# Patient Record
Sex: MALE | Race: WHITE | Employment: FULL TIME | URBAN - NONMETROPOLITAN AREA
[De-identification: names, ages, dates, MRNs, and addresses within clinical notes are randomized per-mention and may not be internally consistent; named-entity substitution may affect disease eponyms.]

---

## 2019-04-23 ENCOUNTER — APPOINTMENT (OUTPATIENT)
Dept: GENERAL RADIOLOGY | Age: 54
End: 2019-04-23
Payer: COMMERCIAL

## 2019-04-23 ENCOUNTER — HOSPITAL ENCOUNTER (EMERGENCY)
Age: 54
Discharge: HOME | End: 2019-04-24
Attending: EMERGENCY MEDICINE
Payer: COMMERCIAL

## 2019-04-23 DIAGNOSIS — R00.2 PALPITATIONS: Primary | ICD-10-CM

## 2019-04-23 LAB
ABSOLUTE EOS #: 0.15 K/UL (ref 0–0.44)
ABSOLUTE IMMATURE GRANULOCYTE: <0.03 K/UL (ref 0–0.3)
ABSOLUTE LYMPH #: 3.4 K/UL (ref 1.1–3.7)
ABSOLUTE MONO #: 0.46 K/UL (ref 0.1–1.2)
ANION GAP SERPL CALCULATED.3IONS-SCNC: 11 MMOL/L (ref 9–17)
BASOPHILS # BLD: 1 % (ref 0–2)
BASOPHILS ABSOLUTE: 0.03 K/UL (ref 0–0.2)
BUN BLDV-MCNC: 13 MG/DL (ref 6–20)
BUN/CREAT BLD: 12 (ref 9–20)
CALCIUM SERPL-MCNC: 9.1 MG/DL (ref 8.6–10.4)
CHLORIDE BLD-SCNC: 100 MMOL/L (ref 98–107)
CO2: 29 MMOL/L (ref 20–31)
CREAT SERPL-MCNC: 1.08 MG/DL (ref 0.7–1.2)
DIFFERENTIAL TYPE: ABNORMAL
EOSINOPHILS RELATIVE PERCENT: 2 % (ref 1–4)
GFR AFRICAN AMERICAN: >60 ML/MIN
GFR NON-AFRICAN AMERICAN: >60 ML/MIN
GFR SERPL CREATININE-BSD FRML MDRD: ABNORMAL ML/MIN/{1.73_M2}
GFR SERPL CREATININE-BSD FRML MDRD: ABNORMAL ML/MIN/{1.73_M2}
GLUCOSE BLD-MCNC: 102 MG/DL (ref 74–100)
GLUCOSE BLD-MCNC: 115 MG/DL (ref 70–99)
HCT VFR BLD CALC: 45.6 % (ref 40.7–50.3)
HEMOGLOBIN: 15.3 G/DL (ref 13–17)
IMMATURE GRANULOCYTES: 0 %
LYMPHOCYTES # BLD: 54 % (ref 24–43)
MAGNESIUM: 2.3 MG/DL (ref 1.6–2.6)
MCH RBC QN AUTO: 29.7 PG (ref 25.2–33.5)
MCHC RBC AUTO-ENTMCNC: 33.6 G/DL (ref 28.4–34.8)
MCV RBC AUTO: 88.5 FL (ref 82.6–102.9)
MONOCYTES # BLD: 7 % (ref 3–12)
NRBC AUTOMATED: 0 PER 100 WBC
PDW BLD-RTO: 11.9 % (ref 11.8–14.4)
PLATELET # BLD: 184 K/UL (ref 138–453)
PLATELET ESTIMATE: ABNORMAL
PMV BLD AUTO: 11 FL (ref 8.1–13.5)
POTASSIUM SERPL-SCNC: 3.6 MMOL/L (ref 3.7–5.3)
RBC # BLD: 5.15 M/UL (ref 4.21–5.77)
RBC # BLD: ABNORMAL 10*6/UL
SEG NEUTROPHILS: 36 % (ref 36–65)
SEGMENTED NEUTROPHILS ABSOLUTE COUNT: 2.22 K/UL (ref 1.5–8.1)
SODIUM BLD-SCNC: 140 MMOL/L (ref 135–144)
TROPONIN INTERP: NORMAL
TROPONIN T: <0.03 NG/ML
TROPONIN, HIGH SENSITIVITY: NORMAL NG/L (ref 0–22)
TSH SERPL DL<=0.05 MIU/L-ACNC: 3.19 MIU/L (ref 0.3–5)
WBC # BLD: 6.3 K/UL (ref 3.5–11.3)
WBC # BLD: ABNORMAL 10*3/UL

## 2019-04-23 PROCEDURE — 82947 ASSAY GLUCOSE BLOOD QUANT: CPT

## 2019-04-23 PROCEDURE — 85025 COMPLETE CBC W/AUTO DIFF WBC: CPT

## 2019-04-23 PROCEDURE — 6370000000 HC RX 637 (ALT 250 FOR IP): Performed by: EMERGENCY MEDICINE

## 2019-04-23 PROCEDURE — 96360 HYDRATION IV INFUSION INIT: CPT

## 2019-04-23 PROCEDURE — 71046 X-RAY EXAM CHEST 2 VIEWS: CPT

## 2019-04-23 PROCEDURE — 84484 ASSAY OF TROPONIN QUANT: CPT

## 2019-04-23 PROCEDURE — 80048 BASIC METABOLIC PNL TOTAL CA: CPT

## 2019-04-23 PROCEDURE — 84443 ASSAY THYROID STIM HORMONE: CPT

## 2019-04-23 PROCEDURE — 71045 X-RAY EXAM CHEST 1 VIEW: CPT

## 2019-04-23 PROCEDURE — 93005 ELECTROCARDIOGRAM TRACING: CPT

## 2019-04-23 PROCEDURE — 2580000003 HC RX 258: Performed by: EMERGENCY MEDICINE

## 2019-04-23 PROCEDURE — 99285 EMERGENCY DEPT VISIT HI MDM: CPT

## 2019-04-23 PROCEDURE — 96361 HYDRATE IV INFUSION ADD-ON: CPT

## 2019-04-23 PROCEDURE — 83735 ASSAY OF MAGNESIUM: CPT

## 2019-04-23 RX ORDER — ALPRAZOLAM 0.25 MG/1
0.25 TABLET ORAL NIGHTLY PRN
COMMUNITY

## 2019-04-23 RX ORDER — 0.9 % SODIUM CHLORIDE 0.9 %
1000 INTRAVENOUS SOLUTION INTRAVENOUS ONCE
Status: COMPLETED | OUTPATIENT
Start: 2019-04-23 | End: 2019-04-23

## 2019-04-23 RX ORDER — POTASSIUM CHLORIDE 20 MEQ/1
40 TABLET, EXTENDED RELEASE ORAL ONCE
Status: COMPLETED | OUTPATIENT
Start: 2019-04-23 | End: 2019-04-23

## 2019-04-23 RX ADMIN — POTASSIUM CHLORIDE 40 MEQ: 20 TABLET, EXTENDED RELEASE ORAL at 21:51

## 2019-04-23 RX ADMIN — SODIUM CHLORIDE 1000 ML: 9 INJECTION, SOLUTION INTRAVENOUS at 21:50

## 2019-04-24 ENCOUNTER — OFFICE VISIT (OUTPATIENT)
Dept: CARDIOLOGY | Age: 54
End: 2019-04-24
Payer: COMMERCIAL

## 2019-04-24 ENCOUNTER — HOSPITAL ENCOUNTER (OUTPATIENT)
Age: 54
Discharge: HOME | End: 2019-04-24
Payer: COMMERCIAL

## 2019-04-24 VITALS
BODY MASS INDEX: 19.61 KG/M2 | WEIGHT: 122 LBS | HEIGHT: 66 IN | DIASTOLIC BLOOD PRESSURE: 99 MMHG | TEMPERATURE: 97.8 F | SYSTOLIC BLOOD PRESSURE: 143 MMHG | HEART RATE: 64 BPM | RESPIRATION RATE: 16 BRPM | OXYGEN SATURATION: 97 %

## 2019-04-24 VITALS
OXYGEN SATURATION: 99 % | HEART RATE: 55 BPM | BODY MASS INDEX: 19.18 KG/M2 | SYSTOLIC BLOOD PRESSURE: 149 MMHG | HEIGHT: 67 IN | WEIGHT: 122.2 LBS | DIASTOLIC BLOOD PRESSURE: 92 MMHG | RESPIRATION RATE: 18 BRPM

## 2019-04-24 DIAGNOSIS — R53.83 FATIGUE, UNSPECIFIED TYPE: ICD-10-CM

## 2019-04-24 DIAGNOSIS — R00.2 PALPITATIONS: ICD-10-CM

## 2019-04-24 DIAGNOSIS — R53.83 TIREDNESS: ICD-10-CM

## 2019-04-24 DIAGNOSIS — R00.1 BRADYCARDIA: ICD-10-CM

## 2019-04-24 DIAGNOSIS — R42 LIGHTHEADEDNESS: ICD-10-CM

## 2019-04-24 DIAGNOSIS — R00.1 BRADYCARDIA: Primary | ICD-10-CM

## 2019-04-24 LAB
EKG ATRIAL RATE: 56 BPM
EKG ATRIAL RATE: 77 BPM
EKG P AXIS: 72 DEGREES
EKG P AXIS: 74 DEGREES
EKG P-R INTERVAL: 154 MS
EKG P-R INTERVAL: 162 MS
EKG Q-T INTERVAL: 416 MS
EKG Q-T INTERVAL: 448 MS
EKG QRS DURATION: 92 MS
EKG QRS DURATION: 94 MS
EKG QTC CALCULATION (BAZETT): 432 MS
EKG QTC CALCULATION (BAZETT): 470 MS
EKG R AXIS: 39 DEGREES
EKG R AXIS: 52 DEGREES
EKG T AXIS: 59 DEGREES
EKG T AXIS: 63 DEGREES
EKG VENTRICULAR RATE: 56 BPM
EKG VENTRICULAR RATE: 77 BPM
TROPONIN INTERP: NORMAL
TROPONIN T: <0.03 NG/ML
TROPONIN, HIGH SENSITIVITY: NORMAL NG/L (ref 0–22)

## 2019-04-24 PROCEDURE — 83835 ASSAY OF METANEPHRINES: CPT

## 2019-04-24 PROCEDURE — 99214 OFFICE O/P EST MOD 30 MIN: CPT | Performed by: FAMILY MEDICINE

## 2019-04-24 PROCEDURE — 93005 ELECTROCARDIOGRAM TRACING: CPT

## 2019-04-24 PROCEDURE — 36415 COLL VENOUS BLD VENIPUNCTURE: CPT

## 2019-04-24 NOTE — PATIENT INSTRUCTIONS
SURVEY:    You may be receiving a survey from [employer]) regarding your visit today. Please complete the survey to enable us to provide the highest quality of care to you and your family. If you cannot score us a very good on any question, please call the office to discuss how we could have made your experience a very good one. Thank you.

## 2019-04-24 NOTE — ED PROVIDER NOTES
[facility] COMPLAINT   Chief Complaint   Patient presents with    Chest Pain     began earlier today as not feeling right, has progressedf through day to ceeling like chest fluttering      HPI   Yuridia Evans is a 48 y.o. male who presents after palpitations. [place] 11 days ago he had some chest tight ness and low BP, and today his BP has been high and he has been shaky ( tremulous). No chest tightness  Today though. He is ajogger but has been jogging in 3 weeks. He lost a lot of weight in January after a relationship ended. He has not had any issues with high blood pressure ever before. Is not any vomiting or sweating but he did feel been clammy earlier. . There is no pain    REVIEW OF SYSTEMS   Cardiac: No Chest Pain, + palpitations  Neurologic: no Headache  Respiratory: No SOB  GI: No abdominal pain or vomiting   General: Denies Fever  All other review of systems otherwise negative. PAST MEDICAL & SURGICAL HISTORY   History reviewed. No pertinent past medical history. History reviewed. No pertinent surgical history. CURRENT MEDICATIONS   Current Outpatient Rx   Medication Sig Dispense Refill    buPROPion HCl (WELLBUTRIN PO) Take by mouth      ALPRAZolam (XANAX) 0.25 MG tablet Take 0.25 mg by mouth nightly as needed for Sleep.         ALLERGIES   No Known Allergies   SOCIAL & FAMILY HISTORY   Social History     Socioeconomic History    Marital status: Single     Spouse name: None    Number of children: None    Years of education: None    Highest education level: None   Occupational History    None   Social Needs    Financial resource strain: None    Food insecurity:     Worry: None     Inability: None    Transportation needs:     Medical: None     Non-medical: None   Tobacco Use    Smoking status: Never Smoker    Smokeless tobacco: Never Used   Substance and Sexual Activity    Alcohol use: Not Currently    Drug use: Never    Sexual activity: None Lifestyle    Physical activity:     Days per week: None     Minutes per session: None    Stress: None   Relationships    Social connections:     Talks on phone: None     Gets together: None     Attends Taoism service: None     Active member of club or organization: None     Attends meetings of clubs or organizations: None     Relationship status: None    Intimate partner violence:     Fear of current or ex partner: None     Emotionally abused: None     Physically abused: None     Forced sexual activity: None   Other Topics Concern    None   Social History Narrative    None     History reviewed. No pertinent family history. PHYSICAL EXAM   VITAL SIGNS: BP (!) 143/99   Pulse 64   Temp 97.8 °F (36.6 °C) (Tympanic)   Resp 16   Ht 5' 6\" (1.676 m)   Wt 122 lb (55.3 kg)   SpO2 97%   BMI 19.69 kg/m²    Constitutional: Well developed, well nourished, no acute distress   HENT: Atraumatic, moist mucus membranes, pupils equally round and reactive to light, extraocular movements intact  Neck: supple, no JVD   Respiratory: Lungs Clear, no retractions   Cardiovascular: Reg rate, no murmurs  Vascular: Radial pulses 2+ equal bilaterally  GI: Soft, nontender, normal bowel sounds  Musculoskeletal: No edema, no deformities  Integument: Skin warm and dry, no petechiae   Neurologic: Alert & oriented, grossly intact, pt was having some muscle fasciculations which stopped midway through his visit    Psych: Pleasant affect, no hallucinations     EKG (interpreted by me) Interpretation  Rhythm: normal sinus   Rate: 77 BPM  No stemi, no ectopy  RADIOLOGY/PROCEDURES   XR CHEST STANDARD (2 VW)   Final Result   No acute cardiopulmonary process. XR CHEST PORTABLE   Final Result   Indeterminate 8 mm nodular opacity in the left lower lung which could be   related to a nipple shadow. Otherwise no acute cardiopulmonary findings. Consider short-term follow-up or repeat exam with nipple markers in place.              ED COURSE & MEDICAL DECISION MAKING   Pertinent Labs & Imaging studies reviewed and interpreted. (See chart for details)  See chart for details of medications given during the ED stay. Vitals:    04/24/19 0040   BP: (!) 143/99   Pulse:    Resp:    Temp:    SpO2:      Differential Diagnosis: Cardiac arrhythmia, drop attack from a subarachnoid hemorrhage, sepsis, infection, anemia, Myocardial Infarction, seizure, vasovagal syncope, other. Mdm: Patient is well-appearing and 2 negative troponins. He is not having chest pain. I recommended they follow up with Dr. Adriana Edmond for a Holter monitor, and his regular doctor for further workup of his fasciculations and elevated blood pressure. FINAL IMPRESSION   1.  Palpitations        Plan: outpatient follow up    Electronically signed by Arlen Jacobs MD on 4/24/19 at 12:59 AM            Arlen Jacobs MD  04/24/19 8987

## 2019-04-24 NOTE — PROGRESS NOTES
Claire Cortez am scribing for and in the presence of Mele Kc. Fidelina ABRAHAM, MS, F.A.C.C..    Patient: Esthela Winslow  : 1965  Date of Visit: 2019    REASON FOR VISIT / CONSULTATION: Establish Cardiologist (HX:Bradycardia, fatigue Pt is here today to est cardiac care he has history of severe bradycardia and extreme fatigue. He states while at work his left side chest felt heaviness once home /52 pulse 138/68 P51 that night BP ran P:46 108/66 P55 woke up Sat felt better. He did seen PCP and he heard irregular heart beats. Week ago his /68 P47. He is feeling chest flutters and shakes, he did have 1 dull lingerg pain. Yesterday /90 P63 body became shaky fatigue then called 911 and brought in. ) and Other (He was treated with fluids and potassium.)      History of Present Illness:        Dear Buster Anderson, DO    I had the pleasure of seeing  Esthela Winslow in my office today. Mr. Jj Hughes is a 48 y.o. male with a recent history of fatigue. Mr. Jj Hughes went to see PCP for fatigue and feeling chest flutters he heard irregular heart beats then referred him. He states he has increased fatigue and became shaky and blood pressure began to rise and symptoms worsened which then EMS was called and he was brought to [facility] on 2019 EKG shows sinus Bradycardia. Mr. Jj Hughes states that recently had uncontrolled shakiness everywhere which was associated with very high blood pressure. He also reports on 19 while at work he felt like he had chest heaviness and his symptoms have been worsening since. He reports he became clammy, dry mouth and Blood Pressure was over 200 and his neighbors called EMS where he was transferred to [facility] and was treated. He states he had some lightheadedness during the episodes. He reports that his blood pressure is usually around 775-207 systolic and denies ever having high blood pressure in the past.    He denies ever being a smoker or any family history of heart disease. He reports having a relatively good exercise tolerance and denied any current or recent chest pain, shortness of breath, abdominal pain, bleeding problems, problems with his medications or any other concerns at this time. PAST MEDICAL HISTORY:       No past medical history on file. CURRENT ALLERGIES: Biltricide [praziquantel] REVIEW OF SYSTEMS: 14 systems were reviewed. Pertinent positives and negatives as above, all else negative. No past surgical history on file. Social History:  Social History     Tobacco Use    Smoking status: Never Smoker    Smokeless tobacco: Never Used   Substance Use Topics    Alcohol use: Not Currently    Drug use: Never        CURRENT MEDICATIONS:        Outpatient Medications Marked as Taking for the 4/24/19 encounter (Office Visit) with Amanda Blancas MD   Medication Sig Dispense Refill    buPROPion HCl (WELLBUTRIN PO) Take 150 mg by mouth       ALPRAZolam (XANAX) 0.25 MG tablet Take 0.25 mg by mouth nightly as needed for Sleep. FAMILY HISTORY: family history is not on file. Physical Examination:     BP (!) 149/92 (Site: Left Upper Arm, Position: Sitting, Cuff Size: Medium Adult)   Pulse 55   Resp 18   Ht 5' 6.5\" (1.689 m)   Wt 122 lb 3.2 oz (55.4 kg)   SpO2 99%   BMI 19.43 kg/m²  Body mass index is 19.43 kg/m². Constitutional: He appeared oriented to person and place. He appears well-developed and well-nourished. In no acute distress. HEENT: Normocephalic and atraumatic. No JVD present. Carotid bruit is not present. No mass and no thyromegaly present. No lymphadenopathy noted. Cardiovascular: Bradycardic rate, regular rhythm, normal heart sounds. Exam reveals no gallop and no friction rubs. 1/6 systolic murmur, 2nd intercostal space on the LEFT just lateral to the sternum  Pulmonary/Chest: Effort normal and breath sounds normal. No respiratory distress. He has no wheezes, rhonchi or rales. Abdominal: Soft, non-tender.  He exhibits no organomegaly, mass or bruit. Extremities: None. No cyanosis or clubbing. 2+ radial and carotid pulses. Distal extremity pulses: 2+ bilaterally. Neurological: Alertness and orientation as per Constitutional exam. No evidence of gross cranial nerve deficit. Coordination appeared normal.   Skin: Skin is warm and dry. There is no rash or diaphoresis. Psychiatric: He has a normal mood and affect. His speech is normal and behavior is normal.      MOST RECENT LABS ON RECORD:   Lab Results   Component Value Date    WBC 6.3 04/23/2019    HGB 15.3 04/23/2019    HCT 45.6 04/23/2019     04/23/2019     04/23/2019    K 3.6 (L) 04/23/2019     04/23/2019    CREATININE 1.08 04/23/2019    BUN 13 04/23/2019    CO2 29 04/23/2019    TSH 3.19 04/23/2019       ASSESSMENT:     1. Bradycardia    2. Lightheadedness    3. Tiredness    4. Fatigue, unspecified type    5. Palpitations       PLAN:        Bradycardia: Probably asymptomatic  · Beta Blocker: Not indicated      · Calcium Channel Blocker: Not indicated       · Additional Testing: I Ordered an Echocardiogram to assess Mr. Cox's ejection fraction and to look for significant structural causes of Mr. Cox symptoms    · Lightheadedness/dizziness: associated with sudden high blood pressure and chest discomfort  · Additional Testing List: I ordered a treadmill stress test WITHOUT imaging to try and rule out this possibility. · Because of his abnormal ECG, I ordered a echocardiogram to better assess for the etiology and severity of this problem. · Laboratory testing: Plasma epinephrine level to assess for the possibility of a pheochromocytoma as the source of his symptoms. · Tiredness/Fatigue:  · After discussion we decided to stop Bupropion to determine if this was the cause of recent symptoms. · I have a call out to Dr Marco Antonio Segura and discuss the stopping of medications. Persistent Heart palpitations   · Beta Blocker: Not indicated.  Normal ejection fraction

## 2019-04-29 LAB
METANEPH/PLASMA INTERP: NORMAL
METANEPHRINE: 0.16 NMOL/L (ref 0–0.49)
NORMETANEPHRINE PLASMA: 0.45 NMOL/L (ref 0–0.89)

## 2019-04-30 ENCOUNTER — TELEPHONE (OUTPATIENT)
Dept: CARDIOLOGY | Age: 54
End: 2019-04-30

## 2019-04-30 ENCOUNTER — HOSPITAL ENCOUNTER (OUTPATIENT)
Dept: NON INVASIVE DIAGNOSTICS | Age: 54
Discharge: HOME | End: 2019-04-30
Payer: COMMERCIAL

## 2019-04-30 DIAGNOSIS — R00.1 BRADYCARDIA: ICD-10-CM

## 2019-04-30 DIAGNOSIS — R42 LIGHTHEADEDNESS: ICD-10-CM

## 2019-04-30 PROCEDURE — 78452 HT MUSCLE IMAGE SPECT MULT: CPT

## 2019-04-30 PROCEDURE — 93017 CV STRESS TEST TRACING ONLY: CPT

## 2019-04-30 PROCEDURE — A9500 TC99M SESTAMIBI: HCPCS | Performed by: FAMILY MEDICINE

## 2019-04-30 PROCEDURE — 3430000000 HC RX DIAGNOSTIC RADIOPHARMACEUTICAL: Performed by: FAMILY MEDICINE

## 2019-04-30 PROCEDURE — 93018 CV STRESS TEST I&R ONLY: CPT | Performed by: FAMILY MEDICINE

## 2019-04-30 PROCEDURE — 93016 CV STRESS TEST SUPVJ ONLY: CPT | Performed by: FAMILY MEDICINE

## 2019-04-30 RX ADMIN — Medication 30.2 MILLICURIE: at 09:04

## 2019-04-30 NOTE — TELEPHONE ENCOUNTER
----- Message from Sharon Luna MD sent at 4/29/2019 10:40 PM EDT -----  Let Mr. Morgan Pennington know their test result was ok. Thanks.

## 2019-05-01 ENCOUNTER — HOSPITAL ENCOUNTER (OUTPATIENT)
Dept: NON INVASIVE DIAGNOSTICS | Age: 54
Discharge: HOME | End: 2019-05-01
Payer: COMMERCIAL

## 2019-05-01 PROCEDURE — 3430000000 HC RX DIAGNOSTIC RADIOPHARMACEUTICAL: Performed by: FAMILY MEDICINE

## 2019-05-01 PROCEDURE — A9500 TC99M SESTAMIBI: HCPCS | Performed by: FAMILY MEDICINE

## 2019-05-01 RX ADMIN — Medication 30 MILLICURIE: at 13:01

## 2019-05-02 PROCEDURE — 78452 HT MUSCLE IMAGE SPECT MULT: CPT | Performed by: FAMILY MEDICINE

## 2019-05-02 NOTE — PROCEDURES
[address], [place] [address]                              CARDIAC STRESS TEST    PATIENT NAME: Rosibel Dozier                     :        1965  MED REC NO:   [id number]                              ROOM:  ACCOUNT NO:   [de-identified]                           ADMIT DATE: 2019  PROVIDER:     Genet Segal    CARDIOVASCULAR DIAGNOSTIC DEPARTMENT    DATE OF STUDY:  2019    ORDERING PROVIDER:  Genet Segal MD    PRIMARY CARE PROVIDER:  Angelita Helms DO    INTERPRETING PHYSICIAN:  Genet Segal MD    EXERCISE MYOCARDIAL PERFUSION STRESS TEST REPORT    Stress/rest single-isotope SPECT imaging with exercise stress and gated  SPECT imaging    INDICATION:  Assessment of a cardiac cause of:  Abnormal ECG, bradycardia    CLINICAL HISTORY:  The patient is a 72-year-old man with no known  coronary artery disease. Previous cardiac history includes:  Stress test    Other previous history includes:  Chest pain, palpitations, diaphoresis,  fatigue, dyspnea, lightheadedness    Symptoms just prior to testing included:  None    Relevant medications:  None    PROCEDURE:  The patient performed treadmill exercise using a Aaron  protocol, completing 12:48 minutes and completing an estimated workload  of 55.74 metabolic equivalents (METS). The test was terminated due to fatigue and shortness of breath. The heart rate was 68 beats per minute at baseline and increased to 153  beats per minute at peak exercise, which was 90% of the maximum  predicted heart rate. The rest blood pressure was 126/86 mmHg and  increased to 160/94 mmHg, which is a normal response. During the  procedure, the patient developed fatigue, shortness of breath and leg  fatigue but denied any chest discomfort. Myocardial perfusion imaging: Imaging was performed at rest 30-45  minutes following the injection of 30.0 mCi of sestamibi.   At peak  exercise, the patient was injected with 30.0 mCi of sestamibi and  exercise was continued for 1 minute. Gating post-stress tomographic  imaging was performed 30-45 minutes after stress. STRESS ECG RESULTS:  The resting electrocardiogram demonstrated sinus  bradycardia without definitive ST-segment abnormalities suggestive of  myocardial ischemia. At peak exercise and during recovery, the patient developed:    No significant ST segment changes suggestive of myocardial ischemia with  no premature atrial contractions (PACs) and no premature ventricular  contractions (PVCs). NUCLEAR IMAGING RESULTS:  The overall quality of the study is good. Mild attenuation artifact was seen. There is no evidence of abnormal  lung uptake. Additionally, the right ventricle appears normal.  The  left ventricular cavity is noted to be normal in size on the stress  images. There is no evidence of transient ischemic dilatation (TID) of  the left ventricle. Gated SPECT imaging reveals normal myocardial thickening and wall motion  with a calculated left ventricular ejection fraction of 64%. The rest images demonstrate homogeneous tracer distribution throughout  the myocardium. On stress imaging, a small perfusion abnormality of mild intensity was  noted in the inferolateral region(s) which is most likely due to  artifact. IMPRESSION:  1. Most likely normal myocardial perfusion imaging with soft tissue  artifact but without evidence of significant myocardial ischemia or  infarction. 2.  Global left ventricular systolic function was normal without  regional wall motion abnormalities. 3.  No significant electrocardiographic evidence of myocardial ischemia  during EKG monitoring without significant associated arrhythmias. The patient's Duke Treadmill score is 10, which correlates with a low  risk for significant coronary artery disease.     Overall, these results are most consistent with a low risk for  significant coronary artery disease.           Caroline Mccallo    D: 05/02/2019 13:43:08       T: 05/02/2019 13:44:06     LEONEL/AMI_EDIT  Job#: [id number]     Doc#: Unknown    CC:  Roshan Ashraf

## 2019-05-03 ENCOUNTER — TELEPHONE (OUTPATIENT)
Dept: CARDIOLOGY | Age: 54
End: 2019-05-03

## 2019-05-03 NOTE — TELEPHONE ENCOUNTER
----- Message from Fawn Batista MD sent at 5/2/2019 11:22 PM EDT -----  Let  Rahel Bryce know their test result was ok. Thanks.

## 2019-05-10 ENCOUNTER — HOSPITAL ENCOUNTER (OUTPATIENT)
Dept: NON INVASIVE DIAGNOSTICS | Age: 54
Discharge: HOME | End: 2019-05-10
Payer: COMMERCIAL

## 2019-05-10 DIAGNOSIS — R00.1 BRADYCARDIA: ICD-10-CM

## 2019-05-10 DIAGNOSIS — R53.83 FATIGUE, UNSPECIFIED TYPE: ICD-10-CM

## 2019-05-10 DIAGNOSIS — R42 LIGHTHEADEDNESS: ICD-10-CM

## 2019-05-10 DIAGNOSIS — R53.83 TIREDNESS: ICD-10-CM

## 2019-05-10 DIAGNOSIS — R00.2 PALPITATIONS: ICD-10-CM

## 2019-05-10 LAB
LV EF: 60 %
LVEF MODALITY: NORMAL

## 2019-05-10 PROCEDURE — 93226 XTRNL ECG REC<48 HR SCAN A/R: CPT

## 2019-05-10 PROCEDURE — 93306 TTE W/DOPPLER COMPLETE: CPT

## 2019-05-10 PROCEDURE — 93225 XTRNL ECG REC<48 HRS REC: CPT

## 2019-05-13 ENCOUNTER — TELEPHONE (OUTPATIENT)
Dept: CARDIOLOGY | Age: 54
End: 2019-05-13

## 2019-05-13 NOTE — TELEPHONE ENCOUNTER
----- Message from Ari Calvo MD sent at 5/10/2019  4:54 PM EDT -----  Let Mr. Martinez Staff know their test result was ok. Thanks.

## 2019-05-14 ENCOUNTER — OFFICE VISIT (OUTPATIENT)
Dept: CARDIOLOGY | Age: 54
End: 2019-05-14
Payer: COMMERCIAL

## 2019-05-14 VITALS
BODY MASS INDEX: 19.53 KG/M2 | HEART RATE: 56 BPM | RESPIRATION RATE: 18 BRPM | SYSTOLIC BLOOD PRESSURE: 128 MMHG | WEIGHT: 124.4 LBS | OXYGEN SATURATION: 98 % | HEIGHT: 67 IN | DIASTOLIC BLOOD PRESSURE: 84 MMHG

## 2019-05-14 DIAGNOSIS — R53.83 TIREDNESS: ICD-10-CM

## 2019-05-14 DIAGNOSIS — R42 LIGHTHEADEDNESS: ICD-10-CM

## 2019-05-14 DIAGNOSIS — T50.905A MEDICATION SIDE EFFECT, INITIAL ENCOUNTER: Primary | ICD-10-CM

## 2019-05-14 DIAGNOSIS — R00.1 BRADYCARDIA: ICD-10-CM

## 2019-05-14 DIAGNOSIS — R00.2 PALPITATIONS: ICD-10-CM

## 2019-05-14 PROCEDURE — 99213 OFFICE O/P EST LOW 20 MIN: CPT | Performed by: FAMILY MEDICINE

## 2019-05-14 NOTE — PROGRESS NOTES
Claire Cortez am scribing for and in the presence of Mele Kc. Fidelina ABRAHAM, MS, F.A.C.C..    Patient: Esthela Winslow  : 1965  Date of Visit: May 14, 2019    REASON FOR VISIT / CONSULTATION: Follow-up (HX: Bradycardia, Lightheaded,Palitations, Fatigue Pt is here for follow up  he states he is feeling better since last visit. He did have stress test done 19 Holter 5/10 and echo. Denies: CP, SOB, lightheaded/dizziness)      History of Present Illness:        Dear Buster Anderson, DO    I had the pleasure of seeing  Esthela Winslow in my office today. Mr. Jj Hughes is a 48 y.o. male with a recent history of fatigue. Mr. Jj Hughes went to see PCP for fatigue and feeling chest flutters he heard irregular heart beats then referred him. He states he has increased fatigue and became shaky and blood pressure began to rise and symptoms worsened which then EMS was called and he was brought to [facility] on 2019 EKG shows sinus Bradycardia. Recent testing included stress test that was largely normal Joshua score was 10, his Holter monitor showed Bradycardia 49% of the time and occasional PVC's echo also done 5/10/2019 shows EF>60% and relatively normal.     Mr. Jj Hughes is here for follow up and says that he is feeling much better since last visit. He did cut his Wellbutrin in half and started noticing with the decrease of medication he started feeling better much better and almost all of his previous symptoms have resolved. He reports having a good exercise tolerance and denied any current or recent chest pain, shortness of breath, abdominal pain, bleeding problems, problems with his medications or any other concerns at this time. PAST MEDICAL HISTORY:         Past Medical History:   Diagnosis Date    History of echocardiogram 05/10/2019    EF of >60%. normal LV wall thickness w/ a normal LV cavity size. No definite specific wall motion abnormalities were identified. No significant valvular abnormalities.  No clear evidence of diastolic dysfunction was identified.  History of stress test myoview 04/30/2019    most likely normal myocardial perfusion imaging with soft tissue artifact but without evidence of significant myocardial ischemia or infarction. global LV systolic function was normal w/o regional wall motion abnormalities. no significant electrocardiographic evidence of myocardial ischemia during EKG monitoring w/o significant associated arrhythmias. CURRENT ALLERGIES: Biltricide [praziquantel] REVIEW OF SYSTEMS: 14 systems were reviewed. Pertinent positives and negatives as above, all else negative. No past surgical history on file. Social History:  Social History     Tobacco Use    Smoking status: Never Smoker    Smokeless tobacco: Never Used   Substance Use Topics    Alcohol use: Not Currently    Drug use: Never        CURRENT MEDICATIONS:        Outpatient Medications Marked as Taking for the 5/14/19 encounter (Office Visit) with Natan Pozo MD   Medication Sig Dispense Refill    buPROPion HCl (WELLBUTRIN PO) Take 150 mg by mouth Taking every other day      ALPRAZolam (XANAX) 0.25 MG tablet Take 0.25 mg by mouth nightly as needed for Sleep. FAMILY HISTORY: Reviewed but non-contributory     Physical Examination:     /84 (Site: Right Upper Arm, Position: Sitting, Cuff Size: Medium Adult)   Pulse 56   Resp 18   Ht 5' 6.5\" (1.689 m)   Wt 124 lb 6.4 oz (56.4 kg)   SpO2 98%   BMI 19.78 kg/m²  Body mass index is 19.78 kg/m². Constitutional: He appeared oriented to person and place. He appears well-developed and well-nourished. In no acute distress. HEENT: Normocephalic and atraumatic. No JVD present. Carotid bruit is not present. No mass and no thyromegaly present. No lymphadenopathy noted. Cardiovascular: Bradycardic rate, regular rhythm, normal heart sounds. Exam reveals no gallop and no friction rubs.  1/6 systolic murmur, 2nd intercostal space on the LEFT just lateral to the sternum  Pulmonary/Chest: Effort normal and breath sounds normal. No respiratory distress. He has no wheezes, rhonchi or rales. Abdominal: Soft, non-tender. He exhibits no organomegaly, mass or bruit. Extremities: None. No cyanosis or clubbing. 2+ radial and carotid pulses. Distal extremity pulses: 2+ bilaterally. Neurological: Alertness and orientation as per Constitutional exam. No evidence of gross cranial nerve deficit. Coordination appeared normal.   Skin: Skin is warm and dry. There is no rash or diaphoresis. Psychiatric: He has a normal mood and affect. His speech is normal and behavior is normal.      MOST RECENT LABS ON RECORD:   Lab Results   Component Value Date    WBC 6.3 04/23/2019    HGB 15.3 04/23/2019    HCT 45.6 04/23/2019     04/23/2019     04/23/2019    K 3.6 (L) 04/23/2019     04/23/2019    CREATININE 1.08 04/23/2019    BUN 13 04/23/2019    CO2 29 04/23/2019    TSH 3.19 04/23/2019       ASSESSMENT:     1. Medication side effect, initial encounter    2. Bradycardia    3. Lightheadedness    4. Tiredness    5. Palpitations       PLAN:        Bradycardia: Probably asymptomatic  · Beta Blocker: Not indicated      · Calcium Channel Blocker: Not indicated       · Additional Testing: None   · We did review all of his testing today in office. · Lightheadedness/dizziness: associated with sudden high blood pressure and chest discomfort     · Tiredness/Fatigue: Resolved at this time. Rare Heart palpitations   · Beta Blocker: Not indicated. Normal ejection fraction      · Anticoagulation: Not indicated   · Additional Testing: None     · Medication side effect: Resolved since cutting dose of Wellbutrin in Half: Continue    Finally, I recommended that he continue his other medications and follow up with you as previously scheduled. FOLLOW UP:   I told Mr. Dylan Chavez to call my office if he had any problems, but otherwise I asked him to Return if symptoms worsen or fail to improve. However, I would be happy to see him sooner should the need arise. Sincerely,  Geovanni Hurtado. Fidelina ABRAHAM, MS, F.A.C.C. [facility] Cardiology Specialist    [address] [place] [address] [place], [place], [address] [place] [address]  Phone: [phone], Fax: [phone]     I believe that the risk of significant morbidity and mortality related to the patient's current medical conditions are: low-intermediate. The documentation recorded by the scribe, accurately and completely reflects the services I personally performed and the decisions made by me. Fawn Batista MD, MS, F.A.C.C.  May 14, 2019

## 2019-05-14 NOTE — PATIENT INSTRUCTIONS
SURVEY:    You may be receiving a survey from [employer] regarding your visit today. Please complete the survey to enable us to provide the highest quality of care to you and your family. If you cannot score us a very good on any question, please call the office to discuss how we could have made your experience a very good one. Thank you.

## 2021-07-18 ENCOUNTER — APPOINTMENT (OUTPATIENT)
Dept: GENERAL RADIOLOGY | Age: 56
DRG: 305 | End: 2021-07-18
Payer: COMMERCIAL

## 2021-07-18 ENCOUNTER — APPOINTMENT (OUTPATIENT)
Dept: CT IMAGING | Age: 56
DRG: 305 | End: 2021-07-18
Payer: COMMERCIAL

## 2021-07-18 ENCOUNTER — HOSPITAL ENCOUNTER (INPATIENT)
Age: 56
LOS: 1 days | Discharge: HOME | DRG: 305 | End: 2021-07-19
Attending: INTERNAL MEDICINE | Admitting: INTERNAL MEDICINE
Payer: COMMERCIAL

## 2021-07-18 DIAGNOSIS — I10 HYPERTENSION, UNSPECIFIED TYPE: Primary | ICD-10-CM

## 2021-07-18 LAB
ABSOLUTE EOS #: 0.2 K/UL (ref 0–0.44)
ABSOLUTE IMMATURE GRANULOCYTE: <0.03 K/UL (ref 0–0.3)
ABSOLUTE LYMPH #: 2.75 K/UL (ref 1.1–3.7)
ABSOLUTE MONO #: 0.59 K/UL (ref 0.1–1.2)
ALBUMIN SERPL-MCNC: 4.6 G/DL (ref 3.5–5.2)
ALBUMIN/GLOBULIN RATIO: 1.6 (ref 1–2.5)
ALP BLD-CCNC: 55 U/L (ref 40–129)
ALT SERPL-CCNC: 13 U/L (ref 5–41)
AMPHETAMINE SCREEN URINE: NEGATIVE
ANION GAP SERPL CALCULATED.3IONS-SCNC: 13 MMOL/L (ref 9–17)
AST SERPL-CCNC: 18 U/L
BARBITURATE SCREEN URINE: NEGATIVE
BASOPHILS # BLD: 0 % (ref 0–2)
BASOPHILS ABSOLUTE: 0.04 K/UL (ref 0–0.2)
BENZODIAZEPINE SCREEN, URINE: NEGATIVE
BILIRUB SERPL-MCNC: 0.4 MG/DL (ref 0.3–1.2)
BUN BLDV-MCNC: 18 MG/DL (ref 6–20)
BUN/CREAT BLD: 18 (ref 9–20)
BUPRENORPHINE URINE: NEGATIVE
CALCIUM SERPL-MCNC: 9.2 MG/DL (ref 8.6–10.4)
CANNABINOID SCREEN URINE: NEGATIVE
CHLORIDE BLD-SCNC: 101 MMOL/L (ref 98–107)
CO2: 22 MMOL/L (ref 20–31)
COCAINE METABOLITE, URINE: NEGATIVE
CREAT SERPL-MCNC: 0.98 MG/DL (ref 0.7–1.2)
D-DIMER QUANTITATIVE: <0.27 MG/L FEU (ref 0–0.59)
DIFFERENTIAL TYPE: ABNORMAL
EOSINOPHILS RELATIVE PERCENT: 2 % (ref 1–4)
GFR AFRICAN AMERICAN: >60 ML/MIN
GFR NON-AFRICAN AMERICAN: >60 ML/MIN
GFR SERPL CREATININE-BSD FRML MDRD: ABNORMAL ML/MIN/{1.73_M2}
GFR SERPL CREATININE-BSD FRML MDRD: ABNORMAL ML/MIN/{1.73_M2}
GLUCOSE BLD-MCNC: 106 MG/DL (ref 70–99)
HCT VFR BLD CALC: 47.5 % (ref 40.7–50.3)
HEMOGLOBIN: 16.2 G/DL (ref 13–17)
IMMATURE GRANULOCYTES: 0 %
INR BLD: 1
LYMPHOCYTES # BLD: 31 % (ref 24–43)
MAGNESIUM: 2 MG/DL (ref 1.6–2.6)
MCH RBC QN AUTO: 30 PG (ref 25.2–33.5)
MCHC RBC AUTO-ENTMCNC: 34.1 G/DL (ref 28.4–34.8)
MCV RBC AUTO: 88 FL (ref 82.6–102.9)
MDMA URINE: NORMAL
METHADONE SCREEN, URINE: NEGATIVE
METHAMPHETAMINE, URINE: NEGATIVE
MONOCYTES # BLD: 7 % (ref 3–12)
NRBC AUTOMATED: 0 PER 100 WBC
OPIATES, URINE: NEGATIVE
OXYCODONE SCREEN URINE: NEGATIVE
PARTIAL THROMBOPLASTIN TIME: 26.6 SEC (ref 23.9–33.8)
PDW BLD-RTO: 11.4 % (ref 11.8–14.4)
PHENCYCLIDINE, URINE: NEGATIVE
PLATELET # BLD: 199 K/UL (ref 138–453)
PLATELET ESTIMATE: ABNORMAL
PMV BLD AUTO: 10 FL (ref 8.1–13.5)
POTASSIUM SERPL-SCNC: 3.5 MMOL/L (ref 3.7–5.3)
PROPOXYPHENE, URINE: NEGATIVE
PROTHROMBIN TIME: 13.2 SEC (ref 11.5–14.2)
RBC # BLD: 5.4 M/UL (ref 4.21–5.77)
RBC # BLD: ABNORMAL 10*6/UL
SEG NEUTROPHILS: 60 % (ref 36–65)
SEGMENTED NEUTROPHILS ABSOLUTE COUNT: 5.35 K/UL (ref 1.5–8.1)
SODIUM BLD-SCNC: 136 MMOL/L (ref 135–144)
TEST INFORMATION: NORMAL
TOTAL PROTEIN: 7.4 G/DL (ref 6.4–8.3)
TRICYCLIC ANTIDEPRESSANTS, UR: NEGATIVE
TROPONIN INTERP: NORMAL
TROPONIN INTERP: NORMAL
TROPONIN T: NORMAL NG/ML
TROPONIN T: NORMAL NG/ML
TROPONIN, HIGH SENSITIVITY: <6 NG/L (ref 0–22)
TROPONIN, HIGH SENSITIVITY: <6 NG/L (ref 0–22)
WBC # BLD: 9 K/UL (ref 3.5–11.3)
WBC # BLD: ABNORMAL 10*3/UL

## 2021-07-18 PROCEDURE — G0378 HOSPITAL OBSERVATION PER HR: HCPCS

## 2021-07-18 PROCEDURE — 85025 COMPLETE CBC W/AUTO DIFF WBC: CPT

## 2021-07-18 PROCEDURE — 85379 FIBRIN DEGRADATION QUANT: CPT

## 2021-07-18 PROCEDURE — 6370000000 HC RX 637 (ALT 250 FOR IP): Performed by: NURSE PRACTITIONER

## 2021-07-18 PROCEDURE — 94761 N-INVAS EAR/PLS OXIMETRY MLT: CPT

## 2021-07-18 PROCEDURE — 96374 THER/PROPH/DIAG INJ IV PUSH: CPT

## 2021-07-18 PROCEDURE — 6370000000 HC RX 637 (ALT 250 FOR IP): Performed by: EMERGENCY MEDICINE

## 2021-07-18 PROCEDURE — 80053 COMPREHEN METABOLIC PANEL: CPT

## 2021-07-18 PROCEDURE — 2500000003 HC RX 250 WO HCPCS: Performed by: NURSE PRACTITIONER

## 2021-07-18 PROCEDURE — 80048 BASIC METABOLIC PNL TOTAL CA: CPT

## 2021-07-18 PROCEDURE — 84484 ASSAY OF TROPONIN QUANT: CPT

## 2021-07-18 PROCEDURE — 99283 EMERGENCY DEPT VISIT LOW MDM: CPT

## 2021-07-18 PROCEDURE — 71045 X-RAY EXAM CHEST 1 VIEW: CPT

## 2021-07-18 PROCEDURE — 85610 PROTHROMBIN TIME: CPT

## 2021-07-18 PROCEDURE — 80306 DRUG TEST PRSMV INSTRMNT: CPT

## 2021-07-18 PROCEDURE — 85730 THROMBOPLASTIN TIME PARTIAL: CPT

## 2021-07-18 PROCEDURE — 1200000000 HC SEMI PRIVATE

## 2021-07-18 PROCEDURE — 36415 COLL VENOUS BLD VENIPUNCTURE: CPT

## 2021-07-18 PROCEDURE — 70450 CT HEAD/BRAIN W/O DYE: CPT

## 2021-07-18 PROCEDURE — 2580000003 HC RX 258: Performed by: NURSE PRACTITIONER

## 2021-07-18 PROCEDURE — 83735 ASSAY OF MAGNESIUM: CPT

## 2021-07-18 PROCEDURE — 93005 ELECTROCARDIOGRAM TRACING: CPT | Performed by: INTERNAL MEDICINE

## 2021-07-18 RX ORDER — ACETAMINOPHEN 500 MG
500 TABLET ORAL ONCE
Status: COMPLETED | OUTPATIENT
Start: 2021-07-18 | End: 2021-07-18

## 2021-07-18 RX ORDER — ALPRAZOLAM 0.25 MG/1
0.25 TABLET ORAL NIGHTLY PRN
Status: DISCONTINUED | OUTPATIENT
Start: 2021-07-18 | End: 2021-07-19 | Stop reason: HOSPADM

## 2021-07-18 RX ORDER — FAMOTIDINE 20 MG/1
20 TABLET, FILM COATED ORAL DAILY PRN
COMMUNITY

## 2021-07-18 RX ORDER — DIPHENHYDRAMINE HCL 25 MG
1 CAPSULE ORAL DAILY
Status: ON HOLD | COMMUNITY
End: 2021-07-18

## 2021-07-18 RX ORDER — ONDANSETRON 4 MG/1
4 TABLET, ORALLY DISINTEGRATING ORAL EVERY 8 HOURS PRN
Status: DISCONTINUED | OUTPATIENT
Start: 2021-07-18 | End: 2021-07-19 | Stop reason: HOSPADM

## 2021-07-18 RX ORDER — HYDROCODONE BITARTRATE AND ACETAMINOPHEN 5; 325 MG/1; MG/1
1 TABLET ORAL EVERY 6 HOURS PRN
COMMUNITY
Start: 2021-07-15

## 2021-07-18 RX ORDER — SODIUM CHLORIDE 0.9 % (FLUSH) 0.9 %
10 SYRINGE (ML) INJECTION PRN
Status: DISCONTINUED | OUTPATIENT
Start: 2021-07-18 | End: 2021-07-19 | Stop reason: HOSPADM

## 2021-07-18 RX ORDER — ACETAMINOPHEN 650 MG/1
650 SUPPOSITORY RECTAL EVERY 6 HOURS PRN
Status: DISCONTINUED | OUTPATIENT
Start: 2021-07-18 | End: 2021-07-19 | Stop reason: HOSPADM

## 2021-07-18 RX ORDER — ACETAMINOPHEN 325 MG/1
650 TABLET ORAL EVERY 6 HOURS PRN
Status: DISCONTINUED | OUTPATIENT
Start: 2021-07-18 | End: 2021-07-19 | Stop reason: HOSPADM

## 2021-07-18 RX ORDER — ONDANSETRON 2 MG/ML
4 INJECTION INTRAMUSCULAR; INTRAVENOUS EVERY 6 HOURS PRN
Status: DISCONTINUED | OUTPATIENT
Start: 2021-07-18 | End: 2021-07-19 | Stop reason: HOSPADM

## 2021-07-18 RX ORDER — SODIUM CHLORIDE 0.9 % (FLUSH) 0.9 %
10 SYRINGE (ML) INJECTION EVERY 12 HOURS SCHEDULED
Status: DISCONTINUED | OUTPATIENT
Start: 2021-07-18 | End: 2021-07-19 | Stop reason: HOSPADM

## 2021-07-18 RX ORDER — CLONIDINE HYDROCHLORIDE 0.1 MG/1
0.1 TABLET ORAL ONCE
Status: COMPLETED | OUTPATIENT
Start: 2021-07-18 | End: 2021-07-18

## 2021-07-18 RX ORDER — LABETALOL 100 MG/1
100 TABLET, FILM COATED ORAL EVERY 12 HOURS SCHEDULED
Status: DISCONTINUED | OUTPATIENT
Start: 2021-07-18 | End: 2021-07-19

## 2021-07-18 RX ORDER — SODIUM CHLORIDE 9 MG/ML
INJECTION, SOLUTION INTRAVENOUS CONTINUOUS
Status: DISCONTINUED | OUTPATIENT
Start: 2021-07-18 | End: 2021-07-19

## 2021-07-18 RX ORDER — POLYETHYLENE GLYCOL 3350 17 G/17G
17 POWDER, FOR SOLUTION ORAL DAILY PRN
Status: DISCONTINUED | OUTPATIENT
Start: 2021-07-18 | End: 2021-07-19 | Stop reason: HOSPADM

## 2021-07-18 RX ORDER — NIFEDIPINE 30 MG/1
30 TABLET, FILM COATED, EXTENDED RELEASE ORAL ONCE
Status: COMPLETED | OUTPATIENT
Start: 2021-07-19 | End: 2021-07-18

## 2021-07-18 RX ORDER — ENALAPRILAT 2.5 MG/2ML
1.25 INJECTION INTRAVENOUS ONCE
Status: COMPLETED | OUTPATIENT
Start: 2021-07-18 | End: 2021-07-18

## 2021-07-18 RX ORDER — LORATADINE 10 MG/1
10 TABLET ORAL DAILY
COMMUNITY

## 2021-07-18 RX ORDER — SODIUM CHLORIDE 9 MG/ML
25 INJECTION, SOLUTION INTRAVENOUS PRN
Status: DISCONTINUED | OUTPATIENT
Start: 2021-07-18 | End: 2021-07-19 | Stop reason: HOSPADM

## 2021-07-18 RX ORDER — CETIRIZINE HYDROCHLORIDE 10 MG/1
10 TABLET ORAL DAILY
Status: DISCONTINUED | OUTPATIENT
Start: 2021-07-19 | End: 2021-07-19 | Stop reason: HOSPADM

## 2021-07-18 RX ADMIN — LABETALOL HYDROCHLORIDE 100 MG: 100 TABLET, FILM COATED ORAL at 23:31

## 2021-07-18 RX ADMIN — CLONIDINE HYDROCHLORIDE 0.1 MG: 0.1 TABLET ORAL at 19:53

## 2021-07-18 RX ADMIN — ENALAPRILAT 1.25 MG: 1.25 INJECTION INTRAVENOUS at 18:47

## 2021-07-18 RX ADMIN — ACETAMINOPHEN 500 MG: 500 TABLET, FILM COATED ORAL at 23:30

## 2021-07-18 RX ADMIN — NIFEDIPINE 30 MG: 30 TABLET, EXTENDED RELEASE ORAL at 23:31

## 2021-07-18 RX ADMIN — SODIUM CHLORIDE: 9 INJECTION, SOLUTION INTRAVENOUS at 23:32

## 2021-07-18 ASSESSMENT — PAIN DESCRIPTION - PAIN TYPE: TYPE: ACUTE PAIN

## 2021-07-18 ASSESSMENT — PAIN DESCRIPTION - LOCATION: LOCATION: CHEST

## 2021-07-18 ASSESSMENT — ENCOUNTER SYMPTOMS
EYES NEGATIVE: 1
RESPIRATORY NEGATIVE: 1
GASTROINTESTINAL NEGATIVE: 1

## 2021-07-18 ASSESSMENT — PAIN SCALES - GENERAL
PAINLEVEL_OUTOF10: 0
PAINLEVEL_OUTOF10: 4

## 2021-07-18 NOTE — ED PROVIDER NOTES
[facility]  EMERGENCY DEPARTMENT ENCOUNTER      Pt Name: Mateo Hull  MRN: [id number]  [place] 1965  Date of evaluation: 7/18/2021  Provider: José Miguel Moore, APRN - [address]     Chief Complaint   Patient presents with    Chest Pain     chest pain, shaky         HISTORY OF PRESENT ILLNESS   (Location/Symptom, Timing/Onset, Context/Setting,Quality, Duration, Modifying Factors, Severity)  Note limiting factors. Mateo Hull is a53 y.o. male who presents to the emergency department With complaints of not feeling right. He stated his symptoms began on Friday. He stated he felt better on Saturday however this morning upon waking him up he stated he did not feel well. He stated he has been battling with his blood pressure being up and down. He stated he has no history of hypertension. He stated today his systolic blood pressure was 196. He complained of chest palpitations without pain. He denied dizziness or syncope. Denied nausea vomiting. Complained of feeling hot and diaphoretic followed by: Tingly. He stated his fingers feel cool currently. Denies shortness of breath. He denied any recent illness including fever chills. He denied any cardiac history although he stated he has had a stress test in the past in 2019 with Dr. Jennifer Smith but denied any cardiac catheterizations. He recently had skin cancer removed from his right neck and is currently being treated for that. He complains of feeling fatigued. Nursing Notes werereviewed. REVIEW OF SYSTEMS    (2-9 systems for level 4, 10 or more for level 5)     Review of Systems   Constitutional: Positive for activity change, diaphoresis and fatigue. Negative for chills and fever. HENT: Negative. Eyes: Negative. Respiratory: Negative. Cardiovascular: Positive for palpitations. Gastrointestinal: Negative. Genitourinary: Negative. Musculoskeletal: Negative. Neurological: Positive for weakness. Negative for dizziness, syncope, speech difficulty, light-headedness, numbness and headaches. Psychiatric/Behavioral: Negative. Except as noted above the remainder of the review of systems was reviewed and negative. PAST MEDICAL HISTORY     Past Medical History:   Diagnosis Date    History of echocardiogram 05/10/2019    EF of >60%. normal LV wall thickness w/ a normal LV cavity size. No definite specific wall motion abnormalities were identified. No significant valvular abnormalities. No clear evidence of diastolic dysfunction was identified.  History of stress test myoview 04/30/2019    most likely normal myocardial perfusion imaging with soft tissue artifact but without evidence of significant myocardial ischemia or infarction. global LV systolic function was normal w/o regional wall motion abnormalities. no significant electrocardiographic evidence of myocardial ischemia during EKG monitoring w/o significant associated arrhythmias. SURGICALHISTORY     No past surgical history on file. CURRENT MEDICATIONS       Current Discharge Medication List      CONTINUE these medications which have NOT CHANGED    Details   loratadine (CLARITIN) 10 MG tablet Take 10 mg by mouth daily      ALPRAZolam (XANAX) 0.25 MG tablet Take 0.25 mg by mouth nightly as needed for Sleep.                   Biltricide [praziquantel]    FAMILY HISTORY       Family History   Problem Relation Age of Onset    High Blood Pressure Mother     High Blood Pressure Father           SOCIAL HISTORY       Social History     Socioeconomic History    Marital status: Single     Spouse name: Not on file    Number of children: Not on file    Years of education: Not on file    Highest education level: Not on file   Occupational History    Not on file   Tobacco Use    Smoking status: Never Smoker    Smokeless tobacco: Never Used   Vaping Use    Vaping Use: Never used   Substance and Sexual Activity    Alcohol use: Not Currently    Drug use: Never    Sexual activity: Not on file   Other Topics Concern    Not on file   Social History Narrative    Not on file     Social Determinants of Health     Financial Resource Strain:     Difficulty of Paying Living Expenses:    Food Insecurity:     Worried About Running Out of Food in the Last Year:     [address] in the Last Year:    Transportation Needs:     Lack of Transportation (Medical):  Lack of Transportation (Non-Medical):    Physical Activity:     Days of Exercise per Week:     Minutes of Exercise per Session:    Stress:     Feeling of Stress :    Social Connections:     Frequency of Communication with Friends and Family:     Frequency of Social Gatherings with Friends and Family:     Attends [employer] Services:     Active Member of Clubs or Organizations:     Attends Club or Organization Meetings:     Marital Status:    Intimate Partner Violence:     Fear of Current or Ex-Partner:     Emotionally Abused:     Physically Abused:     Sexually Abused:        SCREENINGS             PHYSICAL EXAM    (up to 7 for level 4, 8 or more for level 5)     ED Triage Vitals [07/18/21 1811]   BP Temp Temp Source Pulse Resp SpO2 Height Weight   (!) 224/122 97.8 °F (36.6 °C) Tympanic 92 26 98 % 5' 6\" (1.676 m) 147 lb (66.7 kg)       Physical Exam  Vitals reviewed. Constitutional:       General: He is not in acute distress. Appearance: Normal appearance. He is normal weight. He is ill-appearing. HENT:      Head: Normocephalic. Nose: Nose normal.      Mouth/Throat:      Mouth: Mucous membranes are moist.   Eyes:      Pupils: Pupils are equal, round, and reactive to light. Cardiovascular:      Rate and Rhythm: Regular rhythm. Tachycardia present. Pulses: Normal pulses. Heart sounds: Normal heart sounds. No murmur heard. No gallop. Pulmonary:      Effort: No respiratory distress. Breath sounds: Normal breath sounds.  No wheezing, rhonchi or rales.   Abdominal:      General: Abdomen is flat. Bowel sounds are normal.      Palpations: Abdomen is soft. There is no mass. Tenderness: There is no abdominal tenderness. Musculoskeletal:         General: No swelling. Normal range of motion. Cervical back: Normal range of motion and neck supple. Right lower leg: No edema. Left lower leg: No edema. Skin:     General: Skin is warm and dry. Capillary Refill: Capillary refill takes less than 2 seconds. Neurological:      General: No focal deficit present. Mental Status: He is alert and oriented to person, place, and time. Psychiatric:         Mood and Affect: Mood normal.         DIAGNOSTIC RESULTS     EKG: All EKG's are interpreted by the Emergency Department Physician who either signs orCo-signs this chart in the absence of a cardiologist.        RADIOLOGY:   plain film images such as CT, Ultrasound and MRI are read by the radiologist. Plain radiographic images are visualized and preliminarily interpreted by the emergency physician with the below findings:        Interpretation per the Radiologist below, ifavailable at the time of this note:    CT HEAD WO CONTRAST   Final Result   No acute intracranial abnormality. XR CHEST PORTABLE   Final Result   No acute cardiopulmonary disease. ED BEDSIDE ULTRASOUND:   Performed by ED Physician - none    LABS:  Labs Reviewed   BASIC METABOLIC PANEL - Abnormal; Notable for the following components:       Result Value    Glucose 106 (*)     Potassium 3.5 (*)     All other components within normal limits   CBC WITH AUTO DIFFERENTIAL - Abnormal; Notable for the following components:    RDW 11.4 (*)     All other components within normal limits   TROPONIN   APTT   PROTIME-INR   D-DIMER, QUANTITATIVE   BASIC TO COMPREHENSIVE UPGRADE   MAGNESIUM   TROPONIN   URINE DRUG SCREEN       All other labs were within normal range ornot returned as of this dictation.     EMERGENCY DEPARTMENT COURSE and DIFFERENTIAL DIAGNOSIS/MDM:   Vitals:    Vitals:    07/18/21 1900 07/18/21 1915 07/18/21 1930 07/18/21 1953   BP: (!) 194/117 (!) 186/118 (!) 189/117 (!) 198/101   Pulse: 80 72 67    Resp: 25 25 (!) 33    Temp:       TempSrc:       SpO2: 97%  98%    Weight:       Height:               MDM  Number of Diagnoses or Management Options     Amount and/or Complexity of Data Reviewed  Clinical lab tests: ordered  Tests in the radiology section of CPT®: ordered         CRITICAL CARE TIME   Total CriticalCare time was 0 minutes, excluding separately reportable procedures. There was a high probability of clinically significant/life threatening deterioration in the patient's condition which required my urgent intervention. CONSULTS:  IP CONSULT TO CASE MANAGEMENT    PROCEDURES:  Unlessotherwise noted below, none     Procedures    FINAL IMPRESSION      1. Hypertension, unspecified type Stable         DISPOSITION/PLAN   DISPOSITION Decision To Admit 07/18/2021 09:00:57 PM      PATIENT REFERRED TO:  No follow-up provider specified.     DISCHARGE MEDICATIONS:  Current Discharge Medication List                 (Please note that portions of this note were completed with a voice recognition program.  Efforts were made to edit the dictations but occasionally words are mis-transcribed.)      YAHAIRA Hayes CNP (electronically signed)  Attending Emergency Provider       YAHAIRA Hayes CNP  07/18/21 2101

## 2021-07-18 NOTE — Clinical Note
Patient Class: Inpatient [101]   REQUIRED: Diagnosis: Uncontrolled hypertension [439467]   Estimated Length of Stay: Estimated stay of less than 2 midnights   Admitting Provider: Merline Lawman [[id number]]   Telemetry/Cardiac Monitoring Required?: Yes

## 2021-07-19 ENCOUNTER — APPOINTMENT (OUTPATIENT)
Dept: NON INVASIVE DIAGNOSTICS | Age: 56
DRG: 305 | End: 2021-07-19
Payer: COMMERCIAL

## 2021-07-19 VITALS
BODY MASS INDEX: 23.82 KG/M2 | HEART RATE: 56 BPM | WEIGHT: 148.2 LBS | RESPIRATION RATE: 16 BRPM | HEIGHT: 66 IN | TEMPERATURE: 98.9 F | DIASTOLIC BLOOD PRESSURE: 67 MMHG | SYSTOLIC BLOOD PRESSURE: 110 MMHG | OXYGEN SATURATION: 98 %

## 2021-07-19 PROBLEM — R07.81 CHEST PAIN, PLEURITIC: Status: ACTIVE | Noted: 2021-07-19

## 2021-07-19 LAB
ANION GAP SERPL CALCULATED.3IONS-SCNC: 11 MMOL/L (ref 9–17)
BUN BLDV-MCNC: 13 MG/DL (ref 6–20)
BUN/CREAT BLD: 17 (ref 9–20)
CALCIUM SERPL-MCNC: 8.8 MG/DL (ref 8.6–10.4)
CHLORIDE BLD-SCNC: 104 MMOL/L (ref 98–107)
CO2: 25 MMOL/L (ref 20–31)
CREAT SERPL-MCNC: 0.75 MG/DL (ref 0.7–1.2)
GFR AFRICAN AMERICAN: >60 ML/MIN
GFR NON-AFRICAN AMERICAN: >60 ML/MIN
GFR SERPL CREATININE-BSD FRML MDRD: ABNORMAL ML/MIN/{1.73_M2}
GFR SERPL CREATININE-BSD FRML MDRD: ABNORMAL ML/MIN/{1.73_M2}
GLUCOSE BLD-MCNC: 101 MG/DL (ref 70–99)
LV EF: 55 %
LVEF MODALITY: NORMAL
POTASSIUM SERPL-SCNC: 4 MMOL/L (ref 3.7–5.3)
SODIUM BLD-SCNC: 140 MMOL/L (ref 135–144)

## 2021-07-19 PROCEDURE — 80048 BASIC METABOLIC PNL TOTAL CA: CPT

## 2021-07-19 PROCEDURE — 6370000000 HC RX 637 (ALT 250 FOR IP): Performed by: NURSE PRACTITIONER

## 2021-07-19 PROCEDURE — G0378 HOSPITAL OBSERVATION PER HR: HCPCS

## 2021-07-19 PROCEDURE — 6360000002 HC RX W HCPCS: Performed by: NURSE PRACTITIONER

## 2021-07-19 PROCEDURE — 36415 COLL VENOUS BLD VENIPUNCTURE: CPT

## 2021-07-19 PROCEDURE — C8929 TTE W OR WO FOL WCON,DOPPLER: HCPCS

## 2021-07-19 PROCEDURE — 96372 THER/PROPH/DIAG INJ SC/IM: CPT

## 2021-07-19 PROCEDURE — 94761 N-INVAS EAR/PLS OXIMETRY MLT: CPT

## 2021-07-19 RX ORDER — AMLODIPINE BESYLATE 5 MG/1
5 TABLET ORAL DAILY
Status: DISCONTINUED | OUTPATIENT
Start: 2021-07-19 | End: 2021-07-19 | Stop reason: HOSPADM

## 2021-07-19 RX ORDER — IBUPROFEN 800 MG/1
800 TABLET ORAL EVERY 8 HOURS PRN
Status: DISCONTINUED | OUTPATIENT
Start: 2021-07-19 | End: 2021-07-19 | Stop reason: HOSPADM

## 2021-07-19 RX ORDER — AMLODIPINE BESYLATE 5 MG/1
5 TABLET ORAL DAILY
Qty: 30 TABLET | Refills: 3 | Status: SHIPPED | OUTPATIENT
Start: 2021-07-20

## 2021-07-19 RX ORDER — IBUPROFEN 800 MG/1
800 TABLET ORAL EVERY 8 HOURS PRN
Qty: 30 TABLET | Refills: 0 | Status: SHIPPED | OUTPATIENT
Start: 2021-07-19

## 2021-07-19 RX ADMIN — AMLODIPINE BESYLATE 5 MG: 5 TABLET ORAL at 08:54

## 2021-07-19 RX ADMIN — CETIRIZINE HYDROCHLORIDE 10 MG: 10 TABLET, FILM COATED ORAL at 08:54

## 2021-07-19 RX ADMIN — ENOXAPARIN SODIUM 40 MG: 40 INJECTION SUBCUTANEOUS at 08:54

## 2021-07-19 ASSESSMENT — PAIN SCALES - GENERAL
PAINLEVEL_OUTOF10: 0
PAINLEVEL_OUTOF10: 0

## 2021-07-19 NOTE — PROGRESS NOTES
IV has been removed, discharge instructions reviewed with patient and patient states understanding. Patient got self dressed, called family for transportation and will be here about after dinner.

## 2021-07-19 NOTE — PROGRESS NOTES
Dr. Josefa Bernal called in for an update on patient at this time. Patient did call out about five minutes ago and states he has chest pain for a short time after getting up and sitting up in the chair. Telemetry reviewed as he is no longer having chest pain at this time. New order to discharge patient. Patient to continue Norvasc 5 mg daily and to continue ibprofen for chest pain as needed. Have patient monitor blood pressure at home intermittently and during chest pain.

## 2021-07-19 NOTE — ED NOTES
Called Dr Prasanna Ghotra via answering service, waiting for call back.       Radha Clark  07/18/21 2029

## 2021-07-19 NOTE — PROGRESS NOTES
Discussed discharge plans with the patient. Patient is a 50year old male here with Uncontrolled hypertension. He is alert , oriented, pleasant and cooperative during our conversation. Patient is single and lives at home with his dog. He uses no medical equipment. Patient does his own cooking and cleaning. He is independent with his ADL's. Patient manages his own medication and drives. He has no outside services in the home. His PCP is Dr. Shannon Christensen DO. He has medical insurance that helps with medication costs. The discharge plan is home with no services. He does have advance directives but they are not on file. LSW to monitor and assist with any needs or concerns as they arise.     KORTNEY Patel

## 2021-07-19 NOTE — PROGRESS NOTES
Patient admitted from ER, report from [facility] given over phone  Patient to room via wheelchair, ambulates to bed  Patient telemetry applied  Patient vitals and assessment completed, he denies any pain, dizziness or SOB at this time  See charting on all  Patient orders to be reviewed and completed  Patient navigator was completed

## 2021-07-19 NOTE — PLAN OF CARE
Problem: Cardiac:  Goal: Ability to maintain an adequate cardiac output will improve  Description: Ability to maintain an adequate cardiac output will improve  Outcome: Ongoing  Note: Patient in with a diagnosis of chest pain  His BP have been elevated  His heart rhythm has been NSR to SA   Goal: Hemodynamic stability will improve  Description: Hemodynamic stability will improve  Outcome: Ongoing  Note: Nurses continue to monitor patient telemetry, vitals, and labs. Problem: Fluid Volume:  Goal: Ability to achieve and maintain adequate urine output will improve  Description: Ability to achieve and maintain adequate urine output will improve  Outcome: Ongoing  Note: Patient urine output will be monitored. He was just admitted to floor around 2300 and has yet to use BRP. Problem: Respiratory:  Goal: Respiratory status will improve  Description: Respiratory status will improve  Outcome: Ongoing  Note: Patient has had no Resp.  Issues, lungs were clear on admission assessment

## 2021-07-19 NOTE — H&P
History and Physical    Patient:  Felisa Zaragoza  MRN: [id number]    Chief Complaint: I do not feel right    History Obtained From:  patient, electronic medical record    PCP: Araseli Briscoe DO    History of Present Illness: The patient is a 50 y.o. male who presents with uncontrolled hypertension. Patient presented to the emergency department with complaints of not feeling right. He stated his symptoms began on Wednesday. He stated he felt better on Saturday however this morning upon waking him up he stated he did not feel well. He stated he has been battling with his blood pressure being up and down. He stated he has no history of hypertension. He stated his systolic blood pressure was 196. He complained of chest palpitations without pain. He denied dizziness or syncope. Denied nausea vomiting. Complained of feeling hot and diaphoretic followed by tingly and fingers. Denies shortness of breath. He denied any recent illness including fever chills. He denied any cardiac history although he stated he has had a stress test in the past in 2019 with Dr. Belinda De León but denied any cardiac catheterizations. He recently had skin cancer, basal cell, removed from his right neck and is currently being treated for that. He complains of feeling fatigued. Currently he complains of left chest wall discomfort with inspiration describes it as sharp. Denies any further palpitations today. Past Medical History:        Diagnosis Date    History of echocardiogram 05/10/2019    EF of >60%. normal LV wall thickness w/ a normal LV cavity size. No definite specific wall motion abnormalities were identified. No significant valvular abnormalities. No clear evidence of diastolic dysfunction was identified.  History of stress test myoview 04/30/2019    most likely normal myocardial perfusion imaging with soft tissue artifact but without evidence of significant myocardial ischemia or infarction.  global LV systolic function was normal w/o regional wall motion abnormalities. no significant electrocardiographic evidence of myocardial ischemia during EKG monitoring w/o significant associated arrhythmias.  IBS (irritable bowel syndrome)     Seasonal allergies        Past Surgical History:        Procedure Laterality Date    SKIN BIOPSY      SKIN CANCER EXCISION         Family History:       Problem Relation Age of Onset    High Blood Pressure Mother     High Blood Pressure Father        Social History:   TOBACCO:   reports that he has never smoked. He has never used smokeless tobacco.  ETOH:   reports previous alcohol use. ELICIT DRUG USE:    Social History     Substance and Sexual Activity   Drug Use Never     OCCUPATION: Unknown      Allergies:  Praziquantel    Medications Prior to Admission:    Prior to Admission medications    Medication Sig Start Date End Date Taking? Authorizing Provider   loratadine (CLARITIN) 10 MG tablet Take 10 mg by mouth daily   Yes Historical Provider, MD   HYDROcodone-acetaminophen (NORCO) 5-325 MG per tablet Take 1 tablet by mouth every 6 hours as needed. 7/15/21  Yes Historical Provider, MD   Multiple Vitamin (MULTI-VITAMIN) TABS Take 1 tablet by mouth daily   Yes Historical Provider, MD   famotidine (PEPCID) 20 MG tablet Take 20 mg by mouth daily as needed (heart burn)   Yes Historical Provider, MD   ALPRAZolam (XANAX) 0.25 MG tablet Take 0.25 mg by mouth nightly as needed for Sleep. Historical Provider, MD       Review of Systems:  Constitutional:negative  for fevers, and negative for chills.   Eyes: negative for visual disturbance   ENT: negative for sore throat, negative nasal congestion, and negative for earache  Respiratory: negative for shortness of breath, negative for cough, and negative for wheezing  Cardiovascular: positive for chest pain, negative for palpitations, and negative for syncope  Gastrointestinal: negative for abdominal pain, negative for nausea,negative for vomiting, negative for diarrhea, negative for constipation, and negative for hematochezia or melena  Genitourinary: negative for dysuria, negative for urinary urgency, negative for urinary frequency, and negative for hematuria  Skin: negative for skin rash, and negative for skin lesions  Neurological: negative for unilateral weakness, numbness or tingling. Physical Exam:    Vitals:   Temp: 97.6 °F (36.4 °C)  BP: 108/72  Resp: 18  Pulse: 54  SpO2: 97 %  24HR INTAKE/OUTPUT:      Intake/Output Summary (Last 24 hours) at 7/19/2021 0848  Last data filed at 7/19/2021 0425  Gross per 24 hour   Intake 559.4 ml   Output --   Net 559.4 ml       Exam:  GEN:  alert and oriented to person, place and time, well-developed and well-nourished, in no acute distress  EYES: No gross abnormalities. , PERRL and EOMI  NECK: normal, supple, no lymphadenopathy,  no carotid bruits  PULM: clear to auscultation bilaterally- no wheezes, rales or rhonchi, normal air movement, no respiratory distress  COR: regular rate & rhythm, no murmurs, no gallops, S1 normal and S2 normal  ABD:  soft, non-tender, non-distended, normal bowel sounds, no masses or organomegaly  EXT:   no cyanosis, clubbing or edema present    NEURO: follows commands, OLIVERA, no deficits  SKIN:  no rashes or significant lesions  -----------------------------------------------------------------  Diagnostic Data:   Lab Results   Component Value Date    WBC 9.0 07/18/2021    HGB 16.2 07/18/2021     07/18/2021       Lab Results   Component Value Date    BUN 13 07/19/2021    CREATININE 0.75 07/19/2021     07/19/2021    K 4.0 07/19/2021    CALCIUM 8.8 07/19/2021     07/19/2021    CO2 25 07/19/2021    LABGLOM >60 07/19/2021       No results found for: Loni Finical, EPITHUA, LEUKOCYTESUR, SPECGRAV, GLUCOSEU, KETUA, PROTEINU, HGBUR, CASTUA, CRYSTUA, BACTERIA, YEAST    Lab Results   Component Value Date    TROPONINT NOT REPORTED 07/18/2021       CT HEAD WO CONTRAST    Result Date: 7/18/2021  EXAMINATION: CT OF THE HEAD WITHOUT CONTRAST  7/18/2021 7:48 pm TECHNIQUE: CT of the head was performed without the administration of intravenous contrast. Dose modulation, iterative reconstruction, and/or weight based adjustment of the mA/kV was utilized to reduce the radiation dose to as low as reasonably achievable. COMPARISON: None. HISTORY: ORDERING SYSTEM PROVIDED HISTORY: htn urgency TECHNOLOGIST PROVIDED HISTORY: htn urgency Decision Support Exception - unselect if not a suspected or confirmed emergency medical condition->Emergency Medical Condition (MA) FINDINGS: BRAIN/VENTRICLES: There is no acute intracranial hemorrhage, mass effect or midline shift. No abnormal extra-axial fluid collection. The gray-white differentiation is maintained without acute infarct. There is no hydrocephalus. ORBITS: The visualized portion of the orbits demonstrate no acute abnormality. SINUSES: The visualized paranasal sinuses and mastoid air cells demonstrate no acute abnormality. SOFT TISSUES/SKULL:  No acute abnormality of the visualized skull or soft tissues. No acute intracranial abnormality. XR CHEST PORTABLE    Result Date: 7/18/2021  EXAMINATION: ONE XRAY VIEW OF THE CHEST 7/18/2021 7:22 pm COMPARISON: 04/23/2019 HISTORY: ORDERING SYSTEM PROVIDED HISTORY: chest pain TECHNOLOGIST PROVIDED HISTORY: chest pain FINDINGS: Lungs are clear. No pleural effusion or pneumothorax. Normal cardiomediastinal silhouette and pulmonary vascularity. No acute osseous abnormality. No acute cardiopulmonary disease. CT HEAD WO CONTRAST   Final Result   No acute intracranial abnormality. XR CHEST PORTABLE   Final Result   No acute cardiopulmonary disease. Assessment:    Principal Problem:    Uncontrolled hypertension  Active Problems:    Chest pain, pleuritic  Resolved Problems:    * No resolved hospital problems.  *      Patient Active Problem List    Diagnosis Date Noted    Medication side effect, initial encounter 05/14/2019    Chest pain, pleuritic 07/19/2021    Uncontrolled hypertension 07/18/2021       Plan:     · This patient requires inpatient admission because of uncontrolled hypertension  · Factors affecting the medical complexity of this patient include pleuritic chest pain  · Estimated length of stay is 1 days  · Uncontrolled hypertension  · Stop labetalol  · Start Norvasc  · Troponins-negative  · EKG-negative  · Echocardiogram today  · IV lock  · Pleuritic chest pain   · Troponins negative  · Chest x-ray negative for acute process  · Start ibuprofen 800 mg  · DVT prophylaxis: Lovenox  · Peptic ulcer prophylaxis: Pepcid  · High risk medications: none  · Social Service and Case Management consults for DC planning  · Dietician consult initiated    CORE MEASURES  DVT prophylaxis: Lovenox  Decubitus ulcer present on admission: No  CODE STATUS: FULL CODE  Nutrition Status: good   Physical therapy: No   Old Charts reviewed: Yes  EKG Reviewed:  Yes  Advance Directive Addressed: Yes    YAHAIRA Davis - CNP, YAHAIRA, NP-C  7/19/2021, 8:48 AM

## 2021-07-19 NOTE — ED NOTES
Dr Marilou Winters called back, connected call to Jenna Walters NP.       Bradley Jolley  07/18/21 2032

## 2021-07-20 LAB
EKG ATRIAL RATE: 84 BPM
EKG P AXIS: 50 DEGREES
EKG P-R INTERVAL: 156 MS
EKG Q-T INTERVAL: 388 MS
EKG QRS DURATION: 98 MS
EKG QTC CALCULATION (BAZETT): 458 MS
EKG R AXIS: -4 DEGREES
EKG T AXIS: 38 DEGREES
EKG VENTRICULAR RATE: 84 BPM

## 2021-07-20 PROCEDURE — 93010 ELECTROCARDIOGRAM REPORT: CPT | Performed by: INTERNAL MEDICINE

## 2021-07-20 NOTE — PROGRESS NOTES
Physician Progress Note      PATIENT:               Isra BHAGAT #:                  [id number]  :                       1965  ADMIT DATE:       2021 6:11 PM  [address] DATE:        2021 5:41 PM  RESPONDING  PROVIDER #:        Sakina Stacy MD          QUERY TEXT:      Pt admitted with uncontrolled hypertension. On admission BP  224/122 ->   213/111 -> 212/118    If possible, please document in progress notes and discharge summary if you   are evaluating and/or treating any of the following: The medical record reflects the following:  Risk Factors: per H&P no hx hypertension  Clinical Indicators: c/o \"not feeling right\", feeling hot, diaphoretic   followed by tingly and fingers; chest palpitations,  On admission BP  224/122   -> 213/111 -> 212/118  Treatment: Vasotec IV, clonidine, tab, labetalol tab, amlodipine tab    nifedipine tab      Hypertensive Crisis, unspecified: at least 2 consecutive readings of SBP > 180   mmHg or DBP > 110 mmHg  - Hypertensive Urgency: Hypertensive crisis w/o associated organ dysfunction. S/s may or may not be present, but can include severe headache, SOB,   epistaxis, severe anxiety. Tx: adjustment of oral antihypertensives; IV meds   not usually required. - Hypertensive Emergency: Hypertensive crisis w/ associated organ damage   (stroke, encephalopathy, LOVE, MI, angina, acute or decompensated CHF, acute   pulmonary edema, HELLP, etc.). Requires immediate treatment (usually IV meds)   & possible ICU admission. Associated organ dysfunction needs documented. [email or web address]. [address] JEFFERY RN, [address] [place] [address]   [phone]  .   Options provided:  -- Hypertensive Crisis  -- Hypertensive Emergency  -- Hypertensive Urgency  -- Other - I will add my own diagnosis  -- Disagree - Not applicable / Not valid  -- Disagree - Clinically unable to determine / Unknown  -- Refer to Clinical Documentation Reviewer    PROVIDER RESPONSE TEXT:    This patient has hypertensive urgency.     Query created by: Reji Ibrahim on 7/19/2021 1:34 PM      Electronically signed by:  Sakina Stacy MD 7/20/2021 8:12 AM

## 2021-07-20 NOTE — DISCHARGE SUMMARY
Discharge Summary    Mervat Mckeon  :  1965  MRN:  [id number]    Admit date:  2021      Discharge date:  2021     Admitting Physician:  Nella Frias MD    Discharge Diagnoses:      Principal Problem:    Uncontrolled hypertension  Active Problems:    Chest pain, pleuritic  Resolved Problems:    * No resolved hospital problems. *      Active Hospital Problems    Diagnosis Date Noted    Chest pain, pleuritic [R07.81] 2021    Uncontrolled hypertension [I10] 2021       Discharge Medications:       Brenda Almonte   Home Medication Instructions [id number]    Printed on:21 7850   Medication Information                      ALPRAZolam (XANAX) 0.25 MG tablet  Take 0.25 mg by mouth nightly as needed for Sleep. amLODIPine (NORVASC) 5 MG tablet  Take 1 tablet by mouth daily             famotidine (PEPCID) 20 MG tablet  Take 20 mg by mouth daily as needed (heart burn)             HYDROcodone-acetaminophen (NORCO) 5-325 MG per tablet  Take 1 tablet by mouth every 6 hours as needed. ibuprofen (ADVIL;MOTRIN) 800 MG tablet  Take 1 tablet by mouth every 8 hours as needed for Pain             loratadine (CLARITIN) 10 MG tablet  Take 10 mg by mouth daily             Multiple Vitamin (MULTI-VITAMIN) TABS  Take 1 tablet by mouth daily                 Consultants:  none     Hospital Course:   Mervat Mckeon is a 50 y.o. male admitted with  Uncontrolled hypertension. Treated with medication improved and discharged home. Exam:   Regular. Clear lung sounds. No edema.     Condition:   Good    Disposition:   Home    DC summary time : 35 minutes    Patient will be followed by Sofia Cisneros DO in 1-2 weeks    Signed:  Nella Frias MD  2021, 3:39 PM